# Patient Record
Sex: MALE | Race: WHITE | NOT HISPANIC OR LATINO | Employment: FULL TIME | URBAN - METROPOLITAN AREA
[De-identification: names, ages, dates, MRNs, and addresses within clinical notes are randomized per-mention and may not be internally consistent; named-entity substitution may affect disease eponyms.]

---

## 2024-04-04 ENCOUNTER — OFFICE VISIT (OUTPATIENT)
Dept: PRIMARY CARE | Facility: CLINIC | Age: 24
End: 2024-04-04
Payer: COMMERCIAL

## 2024-04-04 VITALS
DIASTOLIC BLOOD PRESSURE: 64 MMHG | TEMPERATURE: 98 F | HEART RATE: 60 BPM | WEIGHT: 153 LBS | BODY MASS INDEX: 22.66 KG/M2 | SYSTOLIC BLOOD PRESSURE: 103 MMHG | HEIGHT: 69 IN

## 2024-04-04 DIAGNOSIS — Z00.00 HEALTH MAINTENANCE EXAMINATION: ICD-10-CM

## 2024-04-04 DIAGNOSIS — M41.129 SCOLIOSIS, ADOLESCENT, ACQUIRED: Primary | ICD-10-CM

## 2024-04-04 DIAGNOSIS — M54.6 THORACOLUMBAR BACK PAIN: ICD-10-CM

## 2024-04-04 DIAGNOSIS — Z13.29 THYROID DISORDER SCREENING: ICD-10-CM

## 2024-04-04 DIAGNOSIS — Z13.220 LIPID SCREENING: ICD-10-CM

## 2024-04-04 DIAGNOSIS — Z11.3 SCREENING FOR STD (SEXUALLY TRANSMITTED DISEASE): ICD-10-CM

## 2024-04-04 DIAGNOSIS — M54.50 THORACOLUMBAR BACK PAIN: ICD-10-CM

## 2024-04-04 DIAGNOSIS — H61.21 IMPACTED CERUMEN OF RIGHT EAR: ICD-10-CM

## 2024-04-04 PROBLEM — S01.01XA SCALP LACERATION: Status: RESOLVED | Noted: 2024-04-04 | Resolved: 2024-04-04

## 2024-04-04 PROBLEM — S00.33XA NASAL CONTUSION: Status: RESOLVED | Noted: 2019-03-28 | Resolved: 2024-04-04

## 2024-04-04 PROBLEM — S09.90XA HEAD INJURY: Status: RESOLVED | Noted: 2024-04-04 | Resolved: 2024-04-04

## 2024-04-04 PROBLEM — S39.012A LUMBAR STRAIN, INITIAL ENCOUNTER: Status: RESOLVED | Noted: 2024-04-04 | Resolved: 2024-04-04

## 2024-04-04 PROBLEM — S01.01XA LACERATION OF SCALP: Status: RESOLVED | Noted: 2024-04-04 | Resolved: 2024-04-04

## 2024-04-04 PROCEDURE — 1036F TOBACCO NON-USER: CPT | Performed by: INTERNAL MEDICINE

## 2024-04-04 PROCEDURE — 99203 OFFICE O/P NEW LOW 30 MIN: CPT | Performed by: INTERNAL MEDICINE

## 2024-04-04 RX ORDER — DEXMETHYLPHENIDATE HYDROCHLORIDE 20 MG/1
20 CAPSULE, EXTENDED RELEASE ORAL AS NEEDED
COMMUNITY
Start: 2019-06-21

## 2024-04-04 ASSESSMENT — ENCOUNTER SYMPTOMS
NUMBNESS: 0
COUGH: 0
BACK PAIN: 1
DIZZINESS: 0
LOSS OF SENSATION IN FEET: 0
LIGHT-HEADEDNESS: 0
AGITATION: 0
DEPRESSION: 0
VOMITING: 0
DIARRHEA: 0
SORE THROAT: 0
WEAKNESS: 0
SHORTNESS OF BREATH: 0
OCCASIONAL FEELINGS OF UNSTEADINESS: 0
FEVER: 0
PALPITATIONS: 0
CONSTIPATION: 0
CHILLS: 0
ABDOMINAL PAIN: 0
NAUSEA: 0

## 2024-04-04 ASSESSMENT — PATIENT HEALTH QUESTIONNAIRE - PHQ9
2. FEELING DOWN, DEPRESSED OR HOPELESS: NOT AT ALL
1. LITTLE INTEREST OR PLEASURE IN DOING THINGS: NOT AT ALL
SUM OF ALL RESPONSES TO PHQ9 QUESTIONS 1 AND 2: 0

## 2024-04-04 NOTE — PROGRESS NOTES
Subjective   Patient ID: Richie Adan is a 23 y.o. male who presents for Establish Care (NEW PATIENT VISIT).    HPI patient presents to the office today to establish care as a new patient as well as discussed scoliosis and also has cerumen impaction on the right ear.  Has a history of ADHD.  Patient was on Focalin for ADHD since 3rd grade until last year.  Patient states he is no longer in school does not feel he needs a medication for ADHD.  Patient goes for runs and works out. 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 Patient does exercises for his history of scoliosis.  Patient has chronic thoracic back pain mostly on the right thoracic region from T6-T10.  He has been wearing special sports tape to the area to help with his discomfort.  Aching type of pain with standing all day long at work.  States he has seen physical therapy in the past does do home exercises.  He is interested in possible TENS unit for help with controlling the pain.  Has had no recent injuries or falls.  We discussed other options for treatment of scoliosis including seeing orthopedic spine specialist, physical medicine rehab, physical therapy, acupuncture, massage therapy.  We discussed over-the-counter NSAID use sparingly.  Patient also complaining of some right ear discomfort when he takes a shower feels like water is getting stuck in the right ear.  No actual ear pain.  Denies any fever, chills, chest pain or shortness of breath with nausea, vomiting diarrhea, abdominal pain.  States he does vape.  No drug use.  He is interested in screening blood work today.    Review of Systems   Constitutional:  Negative for chills and fever.   HENT:  Negative for sore throat.    Eyes:  Negative for visual disturbance.   Respiratory:  Negative for cough and shortness of breath.    Cardiovascular:  Negative for chest pain, palpitations and leg swelling.   Gastrointestinal:  Negative for abdominal pain, constipation, diarrhea, nausea  "and vomiting.   Musculoskeletal:  Positive for back pain.   Skin:  Negative for rash.   Neurological:  Negative for dizziness, syncope, weakness, light-headedness and numbness.   Psychiatric/Behavioral:  Negative for agitation.        Objective   /64   Pulse 60   Temp 36.7 °C (98 °F) (Temporal)   Ht 1.759 m (5' 9.25\")   Wt 69.4 kg (153 lb)   BMI 22.43 kg/m²     Physical Exam  Vitals and nursing note reviewed.   Constitutional:       General: He is not in acute distress.     Appearance: Normal appearance. He is not ill-appearing, toxic-appearing or diaphoretic.   HENT:      Head: Normocephalic and atraumatic.      Right Ear: Tympanic membrane normal. There is impacted cerumen.      Left Ear: Tympanic membrane normal.      Mouth/Throat:      Mouth: Mucous membranes are moist.      Pharynx: Oropharynx is clear. No oropharyngeal exudate.   Eyes:      Pupils: Pupils are equal, round, and reactive to light.   Cardiovascular:      Rate and Rhythm: Normal rate and regular rhythm.      Pulses: Normal pulses.      Heart sounds: Normal heart sounds.   Pulmonary:      Effort: Pulmonary effort is normal. No respiratory distress.      Breath sounds: Normal breath sounds. No wheezing, rhonchi or rales.   Abdominal:      General: Bowel sounds are normal. There is no distension.      Palpations: Abdomen is soft. There is no mass.      Tenderness: There is no abdominal tenderness. There is no guarding or rebound.   Musculoskeletal:         General: Deformity (scoliosis of the thoracic spine with right sided thoracic rib hump) present. No swelling or tenderness.      Cervical back: Neck supple. No tenderness.      Right lower leg: No edema.      Left lower leg: No edema.   Lymphadenopathy:      Cervical: No cervical adenopathy.   Skin:     General: Skin is warm and dry.      Coloration: Skin is not jaundiced or pale.      Findings: No rash.   Neurological:      General: No focal deficit present.      Mental Status: He is " alert and oriented to person, place, and time.      Cranial Nerves: No cranial nerve deficit.      Motor: No weakness.      Gait: Gait normal.   Psychiatric:         Mood and Affect: Mood normal.         Behavior: Behavior normal.         Thought Content: Thought content normal.         Judgment: Judgment normal.         Assessment/Plan   Problem List Items Addressed This Visit             ICD-10-CM    Scoliosis, adolescent, acquired - Primary M41.129    Impacted cerumen of right ear H61.21    Screening for STD (sexually transmitted disease) Z11.3    Relevant Orders    HIV 1/2 Antigen/Antibody Screen with Reflex to Confirmation    Hepatitis C antibody    Syphilis Screen with Reflex    Thyroid disorder screening Z13.29    Relevant Orders    TSH with reflex to Free T4 if abnormal    Lipid screening Z13.220    Relevant Orders    Lipid panel     Other Visit Diagnoses         Codes    Health maintenance examination     Z00.00    Relevant Orders    CBC and Auto Differential    Comprehensive metabolic panel          Scoliosis/thoracolumbar back pain: Imaging of the patient from 9/23/2021 reviewed patient had scoliosis survey showed a thoracolumbar levoscoliosis.  Has been to physical therapy does home exercises uses support tape to the area.  Would benefit from a TENS unit due to chronic pain from scoliosis.  He does stand for long times of the day.  We are putting in a request for such a unit given he is already been to physical therapy and doing home exercises for several years.  We discussed other options for treatment including physical therapy, physical medicine rehab, massage therapy, acupuncture.    Screening for STD: Will check HIV, hepatitis C, syphilis    Impacted cerumen of right ear: We did use irrigation to remove cerumen impaction from the right ear as well as a curette.

## 2025-05-19 ENCOUNTER — APPOINTMENT (OUTPATIENT)
Dept: PRIMARY CARE | Facility: CLINIC | Age: 25
End: 2025-05-19
Payer: COMMERCIAL

## 2025-07-07 ENCOUNTER — APPOINTMENT (OUTPATIENT)
Dept: PRIMARY CARE | Facility: CLINIC | Age: 25
End: 2025-07-07
Payer: COMMERCIAL

## 2025-07-07 VITALS
SYSTOLIC BLOOD PRESSURE: 118 MMHG | WEIGHT: 160 LBS | HEART RATE: 56 BPM | BODY MASS INDEX: 23.7 KG/M2 | DIASTOLIC BLOOD PRESSURE: 84 MMHG | HEIGHT: 69 IN

## 2025-07-07 DIAGNOSIS — L70.9 ACNE, UNSPECIFIED ACNE TYPE: ICD-10-CM

## 2025-07-07 DIAGNOSIS — K62.5 BRIGHT RED BLOOD PER RECTUM: Primary | ICD-10-CM

## 2025-07-07 PROCEDURE — 3008F BODY MASS INDEX DOCD: CPT | Performed by: FAMILY MEDICINE

## 2025-07-07 PROCEDURE — 99214 OFFICE O/P EST MOD 30 MIN: CPT | Performed by: FAMILY MEDICINE

## 2025-07-07 PROCEDURE — 1036F TOBACCO NON-USER: CPT | Performed by: FAMILY MEDICINE

## 2025-07-07 RX ORDER — HYDROCORTISONE 25 MG/G
CREAM TOPICAL 4 TIMES DAILY PRN
Qty: 30 G | Refills: 0 | Status: SHIPPED | OUTPATIENT
Start: 2025-07-07 | End: 2026-07-07

## 2025-07-07 ASSESSMENT — ENCOUNTER SYMPTOMS
DIZZINESS: 0
HEADACHES: 0
CHEST TIGHTNESS: 0
SHORTNESS OF BREATH: 0
FATIGUE: 0
CHILLS: 0

## 2025-07-07 ASSESSMENT — PATIENT HEALTH QUESTIONNAIRE - PHQ9
SUM OF ALL RESPONSES TO PHQ9 QUESTIONS 1 AND 2: 0
1. LITTLE INTEREST OR PLEASURE IN DOING THINGS: NOT AT ALL
2. FEELING DOWN, DEPRESSED OR HOPELESS: NOT AT ALL

## 2025-07-07 NOTE — PROGRESS NOTES
"Subjective   Patient ID: Richie Adan is a 24 y.o. male who presents for Sick Visit (Back pain middle to upper back pain. And acne).    Blood on the toilet paper   - reports he has been having some bright red blood on toilet paper when wiping   - ongoing for the last few weeks   - no blood in the stool, no blood in the toilet bowel   - no pain with bowel movements   - denies any issues with diarrhea or constipation   - usually goes 2-3 times a day   - does not always eat a balance diet, does not eat significant fruits or vegetables   - no family history of significant GI issues, no cancers, IBD   - no nausea, no cramping, no bloating   - no dark tarry colored stools or black stools   - denies any significant masses or bumps when wiping   - no pain with wiping     Acne   - reports he has been dealing with acne for several years   - previously on acutane   - 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   Respiratory:  Negative for chest tightness and shortness of breath.    Neurological:  Negative for dizziness and headaches.       Objective   /84   Pulse 56   Ht 1.759 m (5' 9.25\")   Wt 72.6 kg (160 lb)   BMI 23.46 kg/m²     Physical Exam  Constitutional:       Appearance: Normal appearance.   Neurological:      General: No focal deficit present.      Mental Status: He is alert and oriented to person, place, and time.         Assessment/Plan   Problem List Items Addressed This Visit    None  Visit Diagnoses         Codes      Bright red blood per rectum    -  Primary K62.5    stable   - hemorrhoids   - trial anusol   - consider colonoscopy if not improving     Relevant Medications    hydrocortisone (Anusol-HC) 2.5 % rectal cream      Acne, unspecified acne type     L70.9    stable   - referral back to dermatology   - continue current topical treatment   - f/u with specialist     Relevant Orders    Referral to " Dermatology

## 2025-07-10 ENCOUNTER — APPOINTMENT (OUTPATIENT)
Dept: PRIMARY CARE | Facility: CLINIC | Age: 25
End: 2025-07-10
Payer: COMMERCIAL

## 2025-07-23 ENCOUNTER — OFFICE VISIT (OUTPATIENT)
Dept: DERMATOLOGY | Facility: CLINIC | Age: 25
End: 2025-07-23
Payer: COMMERCIAL

## 2025-07-23 DIAGNOSIS — L73.1 PSEUDOFOLLICULITIS BARBAE: Primary | ICD-10-CM

## 2025-07-23 PROCEDURE — 99204 OFFICE O/P NEW MOD 45 MIN: CPT | Performed by: STUDENT IN AN ORGANIZED HEALTH CARE EDUCATION/TRAINING PROGRAM

## 2025-07-23 RX ORDER — CLINDAMYCIN PHOSPHATE 10 UG/ML
LOTION TOPICAL DAILY
Qty: 60 ML | Refills: 11 | Status: SHIPPED | OUTPATIENT
Start: 2025-07-23 | End: 2026-07-23

## 2025-07-23 RX ORDER — ADAPALENE GEL USP, 0.3% 3 MG/G
GEL TOPICAL
Qty: 45 G | Refills: 2 | Status: SHIPPED | OUTPATIENT
Start: 2025-07-23

## 2025-07-23 RX ORDER — DOXYCYCLINE 100 MG/1
100 CAPSULE ORAL 2 TIMES DAILY
Qty: 180 CAPSULE | Refills: 0 | Status: SHIPPED | OUTPATIENT
Start: 2025-07-23 | End: 2025-10-21

## 2025-07-23 NOTE — Clinical Note
-Uses benzoyl peroxide wash 2x/day and differin gel 2x/day   -CONTINUE benzoyl peroxide wash daily  - START clindamycin 1% lotion to apply daily to the face  -START doxycycline 100 mg bid x 90 days. Reviewed adverse effects including photosensitivity and GI side effects. Advised to take with food and full glass of water. Do not lie down for 30 minutes before lying down   -START differin 0.3% gel nightly. Apply pea-sized amount and apply to the face. Alternate with his OTC differin gel until able to tolerate nightly application.   - Advised to use hair thea or safety razor (single blade) to prevent cutting the hair too short

## 2025-07-23 NOTE — PROGRESS NOTES
Subjective     Richie Adan is a 24 y.o. male who presents for the following: Acne (To face, previously on Accutane for about 1 year (6 yrs ago) currently using Panoxyl wash and Differin gel. ).          Review of Systems:  No other skin or systemic complaints other than what is documented elsewhere in the note.    The following portions of the chart were reviewed this encounter and updated as appropriate:          Skin Cancer History  Biopsy Log Book  No skin cancers from Specimen Tracking.    Additional History      Specialty Problems    None       Objective   Well appearing patient in no apparent distress; mood and affect are within normal limits.    A focused skin examination was performed. All findings within normal limits unless otherwise noted below.    Assessment/Plan   Skin Exam  1. PSEUDOFOLLICULITIS BARBAE  Anterior Mid Neck, Left Anterior Mandible, Left Anterior Neck, Left Chin, Mid Chin, Right Anterior Mandible, Right Anterior Neck, Right Chin  Scattered erythematous papules along the beard line   Tender erythematous subcutaneous nodule on the right jawline   -Uses benzoyl peroxide wash 2x/day and differin gel 2x/day   -CONTINUE benzoyl peroxide wash daily  - START clindamycin 1% lotion to apply daily to the face  -START doxycycline 100 mg bid x 90 days. Reviewed adverse effects including photosensitivity and GI side effects. Advised to take with food and full glass of water. Do not lie down for 30 minutes before lying down   -START differin 0.3% gel nightly. Apply pea-sized amount and apply to the face. Alternate with his OTC differin gel until able to tolerate nightly application.   - Advised to use hair thea or safety razor (single blade) to prevent cutting the hair too short  This Visit  - Follow Up In Dermatology - Established Patient  - doxycycline (Monodox) 100 mg capsule - Take 1 capsule (100 mg) by mouth 2 times a day. Take with at least 8 ounces (large glass) of water, do not lie down for 30  minutes after  - clindamycin (Cleocin T) 1 % lotion - Apply topically once daily. 60g  - adapalene (Differin) 0.3 % gel - Apply a thin layer to affected areas nightly    RTC 6 months    Patient seen and discussed with Dr. Yoo,   Julianna Luciano MD MPH  PGY-3, Department of Dermatology    I saw and evaluated the patient. I personally obtained the key and critical portions of the history and physical exam or was physically present for key and critical portions performed by the resident. I reviewed the resident's documentation and discussed the patient with the resident. I agree with the resident's medical decision making as documented in the note.    Monisha Yoo MD

## 2025-07-23 NOTE — Clinical Note
Scattered erythematous papules along the beard line   Tender erythematous subcutaneous nodule on the right jawline

## 2026-01-21 ENCOUNTER — APPOINTMENT (OUTPATIENT)
Dept: DERMATOLOGY | Facility: CLINIC | Age: 26
End: 2026-01-21
Payer: COMMERCIAL